# Patient Record
Sex: FEMALE | Race: WHITE | NOT HISPANIC OR LATINO | Employment: FULL TIME
[De-identification: names, ages, dates, MRNs, and addresses within clinical notes are randomized per-mention and may not be internally consistent; named-entity substitution may affect disease eponyms.]

---

## 2017-01-20 ENCOUNTER — RECORDS - HEALTHEAST (OUTPATIENT)
Dept: ADMINISTRATIVE | Facility: OTHER | Age: 37
End: 2017-01-20

## 2017-01-30 ENCOUNTER — HOSPITAL ENCOUNTER (OUTPATIENT)
Dept: MAMMOGRAPHY | Facility: CLINIC | Age: 37
Discharge: HOME | End: 2017-01-30
Attending: OBSTETRICS & GYNECOLOGY

## 2017-01-30 ENCOUNTER — HOSPITAL ENCOUNTER (OUTPATIENT)
Dept: ULTRASOUND IMAGING | Facility: CLINIC | Age: 37
Discharge: HOME | End: 2017-01-30
Attending: OBSTETRICS & GYNECOLOGY

## 2017-01-30 ENCOUNTER — COMMUNICATION - HEALTHEAST (OUTPATIENT)
Dept: TELEHEALTH | Facility: CLINIC | Age: 37
End: 2017-01-30

## 2017-01-30 DIAGNOSIS — N64.4 BREAST PAIN: ICD-10-CM

## 2017-01-30 DIAGNOSIS — N60.02 BREAST CYST, LEFT: ICD-10-CM

## 2019-04-05 ENCOUNTER — HOSPITAL ENCOUNTER (OUTPATIENT)
Dept: MAMMOGRAPHY | Facility: CLINIC | Age: 39
Discharge: HOME | End: 2019-04-05
Attending: OBSTETRICS & GYNECOLOGY

## 2019-04-05 DIAGNOSIS — Z12.31 VISIT FOR SCREENING MAMMOGRAM: ICD-10-CM

## 2021-05-30 ENCOUNTER — RECORDS - HEALTHEAST (OUTPATIENT)
Dept: ADMINISTRATIVE | Facility: CLINIC | Age: 41
End: 2021-05-30

## 2021-06-26 ENCOUNTER — HEALTH MAINTENANCE LETTER (OUTPATIENT)
Age: 41
End: 2021-06-26

## 2021-10-16 ENCOUNTER — HEALTH MAINTENANCE LETTER (OUTPATIENT)
Age: 41
End: 2021-10-16

## 2022-07-23 ENCOUNTER — HEALTH MAINTENANCE LETTER (OUTPATIENT)
Age: 42
End: 2022-07-23

## 2022-10-01 ENCOUNTER — HEALTH MAINTENANCE LETTER (OUTPATIENT)
Age: 42
End: 2022-10-01

## 2022-10-15 ENCOUNTER — HOSPITAL ENCOUNTER (OUTPATIENT)
Dept: MRI IMAGING | Facility: CLINIC | Age: 42
Discharge: HOME | End: 2022-10-15
Attending: EMERGENCY MEDICINE | Admitting: EMERGENCY MEDICINE
Payer: COMMERCIAL

## 2022-10-15 DIAGNOSIS — M25.561 RIGHT KNEE PAIN, UNSPECIFIED CHRONICITY: ICD-10-CM

## 2022-10-15 PROCEDURE — 73721 MRI JNT OF LWR EXTRE W/O DYE: CPT | Mod: RT

## 2022-11-10 ENCOUNTER — OFFICE VISIT (OUTPATIENT)
Dept: ORTHOPEDICS | Facility: CLINIC | Age: 42
End: 2022-11-10
Payer: COMMERCIAL

## 2022-11-10 VITALS — WEIGHT: 140 LBS | BODY MASS INDEX: 20.73 KG/M2 | HEIGHT: 69 IN

## 2022-11-10 DIAGNOSIS — M25.561 CHRONIC PAIN OF RIGHT KNEE: Primary | ICD-10-CM

## 2022-11-10 DIAGNOSIS — G89.29 CHRONIC LEFT HIP PAIN: ICD-10-CM

## 2022-11-10 DIAGNOSIS — M25.552 CHRONIC LEFT HIP PAIN: ICD-10-CM

## 2022-11-10 DIAGNOSIS — M24.852 SNAPPING HIP SYNDROME, LEFT: ICD-10-CM

## 2022-11-10 DIAGNOSIS — G89.29 CHRONIC PAIN OF RIGHT KNEE: Primary | ICD-10-CM

## 2022-11-10 DIAGNOSIS — M22.42 CHONDROMALACIA OF LEFT PATELLOFEMORAL JOINT: ICD-10-CM

## 2022-11-10 DIAGNOSIS — M76.899 TENDINOSIS OF QUADRICEPS TENDON: ICD-10-CM

## 2022-11-10 PROCEDURE — 99204 OFFICE O/P NEW MOD 45 MIN: CPT | Performed by: STUDENT IN AN ORGANIZED HEALTH CARE EDUCATION/TRAINING PROGRAM

## 2022-11-10 NOTE — LETTER
"    11/10/2022         RE: Sofia Duran  [address]   [place] [address]        Dear Colleague,    Thank you for referring your patient, Sofia Duran, to the [facility] [place]. Please see a copy of my visit note below.    ASSESSMENT & PLAN  Patient Instructions     1. Chronic pain of right knee    2. Chondromalacia of left patellofemoral joint    3. Tendinosis of quadriceps tendon    4. Chronic left hip pain    5. Snapping hip syndrome, left      Sofia Duran is a 42 year old female presenting for evaluation of acute right knee and left hip pain x several months. She was initially evaluated at [facility] in September where evaluation was concerning for possible meniscus injury, prompting MRI. MRI was completed, showing subtle cartilage wear of the central femoral trochlear sulcus without high-grade cartilage loss, focal tearing, bone bruising/fracture or injury to the meniscus or ligaments. There was also mild popliteus tendinosis and mild quadricepts tendinosis without tearing. Evaluation today is consistent with pain stemming from the mild patellofemoral chondromalacia (cartilage wear under kneecap) with quad tendinosis and poor quad activation. The component of left hip pain appears to be mechanical in nature relating to kinetic chain dysfunction with gluteus medius weakness. Differential includes hip flexor tendinosis or labral tear. We reviewed treatment options inclusive of pain control, activity modification, bracing and formal physical therapy. We also discussed the role of injections including cortisone injection for flares, hyaluronic acid \"gel\" injections for lubrication of the joint, and platelet rich plasma (PRP).     At this time, will proceed with the following plan  - Referral placed for formal physical therapy with a sports medicine PT to work on the right knee and left hip.   - In the meantime, activity as tolerated based " on pain.   - Ibuprofen 400 mg with food every 4-6 hours as needed for pain.  - Consider icing the painful area after exercise and as needed for pain.  - Compression sleeve can be helpful for the knee.    Please schedule a follow up appointment to see me in 6 weeks, or sooner as needed for persistence or worsening of pain. If the hip persists, we can consider proceeding with advanced imaging (MR Arthrogram). You may call our direct clinic number ([phone]) at any time with questions or concerns.    Alexa Villasenor MD, [facility]        -----    SUBJECTIVE  Sofia Duran is a/an 42 year old female who is seen as a self referral for evaluation of right knee pain. The patient is seen by themselves.    Onset: Patient describes pain began in left hip in Spring 2022. She states 3-4 months ago she pushed herself to do a 10K run and felt pain in the right knee afterwards. She noted feeling a click in the right knee while doing yoga the day after the run and the right knee has been painful and swollen ever since.  Location of Pain: right lateral and posterior knee, left anterior hip  Rating of Pain at worst: right knee - 7/10, left hip - 9/10  Rating of Pain Currently: right knee - 3/10, left hip - 3/10  Worsened by: right knee - knee flexion, walking, stairs, running, left hip - getting it car, external rotation, walking, running  Better with: rest / activity avoidance  Treatments tried: rest/activity avoidance, previous imaging (MRI 10/15/22 right knee, xray left hip 9/29/22 @ [facility], xray right knee 9/29/22 @ [facility]) and ice, compression sleeve, strength training for hip, ibuprofen  Associated symptoms: swelling in right knee, clicking in right hip for 2 years, decreased ROM  Orthopedic history: NO  Relevant surgical history: NO  Social history: works as an , patient is a runner and enjoys being active    No past medical history on file.  Social History  "    Socioeconomic History     Marital status:          Patient's past medical, surgical, social, and family histories were reviewed today and no changes are noted.    REVIEW OF SYSTEMS:  10 point ROS is negative other than symptoms noted above in HPI, Past Medical History or as stated below  Constitutional: NEGATIVE for fever, chills, change in weight  Skin: NEGATIVE for worrisome rashes, moles or lesions  GI/: NEGATIVE for bowel or bladder changes  Neuro: NEGATIVE for weakness, dizziness or paresthesias    OBJECTIVE:  Ht 1.753 m (5' 9\")   Wt 63.5 kg (140 lb)   BMI 20.67 kg/m     General: healthy, alert and in no distress  HEENT: no scleral icterus or conjunctival erythema  Skin: no suspicious lesions or rash. No jaundice.  CV: no pedal edema  Resp: normal respiratory effort without conversational dyspnea   Psych: normal mood and affect  Gait: normal steady gait with appropriate coordination and balance  Neuro: Normal light sensory exam of lower extremity  MSK:  RIGHT KNEE  Inspection:    Normal alignment  Palpation:    Tender about the lateral patellar facet, lateral hoffa's and quadriceps insertion. Remainder of bony and ligamentous landmarks are nontender.    Trace effusion is present    Patellofemoral crepitus is Present  Range of Motion:     00 extension to 1350 flexion - anterior knee pain at end-flexion  Strength:    Quadriceps 5-/5 and hamstrings 5/5    Extensor mechanism intact    Dynamic knee valgus with single leg squat L>R  Special Tests:    Positive: Patellar grind, patellar inhibition    Negative: J sign, patellar apprehension, MCL/valgus stress (0 & 30 deg), LCL/varus stress (0 & 30 deg), Lachman's, anterior drawer, posterior drawer, Kam's    LEFT HIP  Inspection:    No swelling, bruising, discoloration, or obvious deformity or asymmetry  Palpation:    Landmarks are nontender.    Crepitus is Absent  Active Range of Motion:     Flexion full, extension full / IR full / ER  " full  Strength:    Flexion 5/5 / extension 5/5 / adduction 5/5 / abduction 4+/5    Gluteus medius 4/5 (L), 5-/5 (RI)  Special Tests:    Positive: Weakness with resisted gluteus medius provocation; mild anterior impingement (FADIR), jamey and Chucho    Negative: CRUZ Kraus Ober's    Independent visualization of the below image:    Results for orders placed or performed during the hospital encounter of 10/15/22   MR Knee Right w/o Contrast    Narrative    EXAM: MR KNEE RIGHT WITHOUT CONTRAST  LOCATION: [facility]  DATE/TIME: 10/15/2022, 5:15 PM    INDICATION: Pain after trauma.  COMPARISON: None.  TECHNIQUE: Unenhanced.    FINDINGS:    MEDIAL COMPARTMENT:   -Meniscus: Normal.  -Cartilage: Normal.    LATERAL COMPARTMENT:  -Meniscus: Normal.   -Cartilage: Normal.    PATELLOFEMORAL COMPARTMENT:   -Alignment: Patella midline. No subluxation or tilting.   -Cartilage: Slight surface irregularity to the articular cartilage of the central femoral trochlear sulcus. No high-grade cartilage loss or focal tearing and no subchondral edema.    CRUCIATE LIGAMENTS:   -ACL: Normal.  -PCL: Normal.    COLLATERAL LIGAMENTS:   -Medial collateral ligament: Superficial and deep fibers are normal.  -Lateral collateral ligament: Normal.    POSTEROMEDIAL CORNER:  -Distal semimembranosus tendon is normal.   -Pes anserine tendons are normal. Posteromedial corner complex ligaments are intact.    POSTEROLATERAL CORNER:   -Mild popliteus tendinopathy without tearing.  -Biceps femoris tendon and posterolateral corner complex ligaments are intact.    EXTENSOR MECHANISM:   -Quadriceps tendon: Mild tendinopathy.  -Patellar tendon: Normal.  -Patellofemoral ligaments and retinacula: Intact.    JOINT:   -Tiny effusion.    BONES:  -No fracture or concerning marrow replacing lesion.    SOFT TISSUES:   -No popliteal cyst. No acute muscular injury or soft tissue mass.         IMPRESSION:  1.  No evidence for internal  derangement.  2.  Slight surface irregularity to the articular cartilage of the central femoral trochlear sulcus but no high-grade cartilage loss or focal tearing. No subchondral edema.  3.  Mild popliteus tendinopathy without tearing.  4.  Mild quadriceps tendinopathy without tearing.  5.  No evidence for fracture or significant bone marrow signal abnormality.  6.  Tiny effusion.       EXAM: XR PELVIS W LT LATERAL HIP   LOCATION: [facility]   DATE/TIME: 9/29/2022 3:04 PM     INDICATION: Pain   COMPARISON: None.     IMPRESSION: Both hips are negative for fracture or dislocation. Pelvis negative for fracture.        Alexa Villasenor MD, [facility]        Again, thank you for allowing me to participate in the care of your patient.        Sincerely,        Alexa Villasenor MD

## 2022-11-10 NOTE — PROGRESS NOTES
"ASSESSMENT & PLAN  Patient Instructions     1. Chronic pain of right knee    2. Chondromalacia of left patellofemoral joint    3. Tendinosis of quadriceps tendon    4. Chronic left hip pain    5. Snapping hip syndrome, left      Sofia Duran is a 42 year old female presenting for evaluation of acute right knee and left hip pain x several months. She was initially evaluated at [facility] in September where evaluation was concerning for possible meniscus injury, prompting MRI. MRI was completed, showing subtle cartilage wear of the central femoral trochlear sulcus without high-grade cartilage loss, focal tearing, bone bruising/fracture or injury to the meniscus or ligaments. There was also mild popliteus tendinosis and mild quadricepts tendinosis without tearing. Evaluation today is consistent with pain stemming from the mild patellofemoral chondromalacia (cartilage wear under kneecap) with quad tendinosis and poor quad activation. The component of left hip pain appears to be mechanical in nature relating to kinetic chain dysfunction with gluteus medius weakness. Differential includes hip flexor tendinosis or labral tear. We reviewed treatment options inclusive of pain control, activity modification, bracing and formal physical therapy. We also discussed the role of injections including cortisone injection for flares, hyaluronic acid \"gel\" injections for lubrication of the joint, and platelet rich plasma (PRP).     At this time, will proceed with the following plan  - Referral placed for formal physical therapy with a sports medicine PT to work on the right knee and left hip.   - In the meantime, activity as tolerated based on pain.   - Ibuprofen 400 mg with food every 4-6 hours as needed for pain.  - Consider icing the painful area after exercise and as needed for pain.  - Compression sleeve can be helpful for the knee.    Please schedule a follow up appointment to see me in 6 weeks, or sooner as needed for " persistence or worsening of pain. If the hip persists, we can consider proceeding with advanced imaging (MR Arthrogram). You may call our direct clinic number ([phone]) at any time with questions or concerns.    Alexa Villasenor MD, [facility]        -----    SUBJECTIVE  Sofia Duran is a/an 42 year old female who is seen as a self referral for evaluation of right knee pain. The patient is seen by themselves.    Onset: Patient describes pain began in left hip in Spring 2022. She states 3-4 months ago she pushed herself to do a 10K run and felt pain in the right knee afterwards. She noted feeling a click in the right knee while doing yoga the day after the run and the right knee has been painful and swollen ever since.  Location of Pain: right lateral and posterior knee, left anterior hip  Rating of Pain at worst: right knee - 7/10, left hip - 9/10  Rating of Pain Currently: right knee - 3/10, left hip - 3/10  Worsened by: right knee - knee flexion, walking, stairs, running, left hip - getting it car, external rotation, walking, running  Better with: rest / activity avoidance  Treatments tried: rest/activity avoidance, previous imaging (MRI 10/15/22 right knee, xray left hip 9/29/22 @ [facility], xray right knee 9/29/22 @ [facility]) and ice, compression sleeve, strength training for hip, ibuprofen  Associated symptoms: swelling in right knee, clicking in right hip for 2 years, decreased ROM  Orthopedic history: NO  Relevant surgical history: NO  Social history: works as an , patient is a runner and enjoys being active    No past medical history on file.  Social History     Socioeconomic History     Marital status:          Patient's past medical, surgical, social, and family histories were reviewed today and no changes are noted.    REVIEW OF SYSTEMS:  10 point ROS is negative other than symptoms noted above in HPI, Past Medical History or as stated  "below  Constitutional: NEGATIVE for fever, chills, change in weight  Skin: NEGATIVE for worrisome rashes, moles or lesions  GI/: NEGATIVE for bowel or bladder changes  Neuro: NEGATIVE for weakness, dizziness or paresthesias    OBJECTIVE:  Ht 1.753 m (5' 9\")   Wt 63.5 kg (140 lb)   BMI 20.67 kg/m     General: healthy, alert and in no distress  HEENT: no scleral icterus or conjunctival erythema  Skin: no suspicious lesions or rash. No jaundice.  CV: no pedal edema  Resp: normal respiratory effort without conversational dyspnea   Psych: normal mood and affect  Gait: normal steady gait with appropriate coordination and balance  Neuro: Normal light sensory exam of lower extremity  MSK:  RIGHT KNEE  Inspection:    Normal alignment  Palpation:    Tender about the lateral patellar facet, lateral hoffa's and quadriceps insertion. Remainder of bony and ligamentous landmarks are nontender.    Trace effusion is present    Patellofemoral crepitus is Present  Range of Motion:     00 extension to 1350 flexion - anterior knee pain at end-flexion  Strength:    Quadriceps 5-/5 and hamstrings 5/5    Extensor mechanism intact    Dynamic knee valgus with single leg squat L>R  Special Tests:    Positive: Patellar grind, patellar inhibition    Negative: J sign, patellar apprehension, MCL/valgus stress (0 & 30 deg), LCL/varus stress (0 & 30 deg), Lachman's, anterior drawer, posterior drawer, Kam's    LEFT HIP  Inspection:    No swelling, bruising, discoloration, or obvious deformity or asymmetry  Palpation:    Landmarks are nontender.    Crepitus is Absent  Active Range of Motion:     Flexion full, extension full / IR full / ER  full  Strength:    Flexion 5/5 / extension 5/5 / adduction 5/5 / abduction 4+/5    Gluteus medius 4/5 (L), 5-/5 (RI)  Special Tests:    Positive: Weakness with resisted gluteus medius provocation; mild anterior impingement (FADIR), jamey and Chucho    Negative: Logroll, CRUZ, Ivan's    Independent " visualization of the below image:    Results for orders placed or performed during the hospital encounter of 10/15/22   MR Knee Right w/o Contrast    Narrative    EXAM: MR KNEE RIGHT WITHOUT CONTRAST  LOCATION: [facility]  DATE/TIME: 10/15/2022, 5:15 PM    INDICATION: Pain after trauma.  COMPARISON: None.  TECHNIQUE: Unenhanced.    FINDINGS:    MEDIAL COMPARTMENT:   -Meniscus: Normal.  -Cartilage: Normal.    LATERAL COMPARTMENT:  -Meniscus: Normal.   -Cartilage: Normal.    PATELLOFEMORAL COMPARTMENT:   -Alignment: Patella midline. No subluxation or tilting.   -Cartilage: Slight surface irregularity to the articular cartilage of the central femoral trochlear sulcus. No high-grade cartilage loss or focal tearing and no subchondral edema.    CRUCIATE LIGAMENTS:   -ACL: Normal.  -PCL: Normal.    COLLATERAL LIGAMENTS:   -Medial collateral ligament: Superficial and deep fibers are normal.  -Lateral collateral ligament: Normal.    POSTEROMEDIAL CORNER:  -Distal semimembranosus tendon is normal.   -Pes anserine tendons are normal. Posteromedial corner complex ligaments are intact.    POSTEROLATERAL CORNER:   -Mild popliteus tendinopathy without tearing.  -Biceps femoris tendon and posterolateral corner complex ligaments are intact.    EXTENSOR MECHANISM:   -Quadriceps tendon: Mild tendinopathy.  -Patellar tendon: Normal.  -Patellofemoral ligaments and retinacula: Intact.    JOINT:   -Tiny effusion.    BONES:  -No fracture or concerning marrow replacing lesion.    SOFT TISSUES:   -No popliteal cyst. No acute muscular injury or soft tissue mass.         IMPRESSION:  1.  No evidence for internal derangement.  2.  Slight surface irregularity to the articular cartilage of the central femoral trochlear sulcus but no high-grade cartilage loss or focal tearing. No subchondral edema.  3.  Mild popliteus tendinopathy without tearing.  4.  Mild quadriceps tendinopathy without tearing.  5.  No evidence for  fracture or significant bone marrow signal abnormality.  6.  Tiny effusion.       EXAM: XR PELVIS W LT LATERAL HIP   LOCATION: [facility]   DATE/TIME: 9/29/2022 3:04 PM     INDICATION: Pain   COMPARISON: None.     IMPRESSION: Both hips are negative for fracture or dislocation. Pelvis negative for fracture.        Alexa Villasenor MD, CAQSM  [facility]

## 2022-11-10 NOTE — PATIENT INSTRUCTIONS
"1. Chronic pain of right knee    2. Chondromalacia of left patellofemoral joint    3. Tendinosis of quadriceps tendon    4. Chronic left hip pain    5. Snapping hip syndrome, left      Sofia DAPHNE Duran is a 42 year old female presenting for evaluation of acute right knee and left hip pain x several months. She was initially evaluated at [facility] in September where evaluation was concerning for possible meniscus injury, prompting MRI. MRI was completed, showing subtle cartilage wear of the central femoral trochlear sulcus without high-grade cartilage loss, focal tearing, bone bruising/fracture or injury to the meniscus or ligaments. There was also mild popliteus tendinosis and mild quadricepts tendinosis without tearing. Evaluation today is consistent with pain stemming from the mild patellofemoral chondromalacia (cartilage wear under kneecap) with quad tendinosis and poor quad activation. The component of left hip pain appears to be mechanical in nature relating to kinetic chain dysfunction with gluteus medius weakness. Differential includes hip flexor tendinosis or labral tear. We reviewed treatment options inclusive of pain control, activity modification, bracing and formal physical therapy. We also discussed the role of injections including cortisone injection for flares, hyaluronic acid \"gel\" injections for lubrication of the joint, and platelet rich plasma (PRP).     At this time, will proceed with the following plan  - Referral placed for formal physical therapy with a sports medicine PT to work on the right knee and left hip.   - In the meantime, activity as tolerated based on pain.   - Ibuprofen 400 mg with food every 4-6 hours as needed for pain.  - Consider icing the painful area after exercise and as needed for pain.  - Compression sleeve can be helpful for the knee.    Please schedule a follow up appointment to see me in 6 weeks, or sooner as needed for persistence or worsening of pain. If the hip " persists, we can consider proceeding with advanced imaging (MR Arthrogram). You may call our direct clinic number ([phone]) at any time with questions or concerns.    Alexa Villasenor MD, [facility] Orthopedic [facility]

## 2022-11-11 ENCOUNTER — THERAPY VISIT (OUTPATIENT)
Dept: PHYSICAL THERAPY | Facility: CLINIC | Age: 42
End: 2022-11-11
Attending: STUDENT IN AN ORGANIZED HEALTH CARE EDUCATION/TRAINING PROGRAM
Payer: COMMERCIAL

## 2022-11-11 DIAGNOSIS — G89.29 CHRONIC LEFT HIP PAIN: ICD-10-CM

## 2022-11-11 DIAGNOSIS — M24.852 SNAPPING HIP SYNDROME, LEFT: ICD-10-CM

## 2022-11-11 DIAGNOSIS — M22.42 CHONDROMALACIA OF LEFT PATELLOFEMORAL JOINT: Primary | ICD-10-CM

## 2022-11-11 DIAGNOSIS — M25.552 CHRONIC LEFT HIP PAIN: ICD-10-CM

## 2022-11-11 DIAGNOSIS — G89.29 CHRONIC PAIN OF RIGHT KNEE: ICD-10-CM

## 2022-11-11 DIAGNOSIS — M76.899 TENDINOSIS OF QUADRICEPS TENDON: ICD-10-CM

## 2022-11-11 DIAGNOSIS — M25.561 CHRONIC PAIN OF RIGHT KNEE: ICD-10-CM

## 2022-11-11 PROCEDURE — 97110 THERAPEUTIC EXERCISES: CPT | Mod: GP | Performed by: PHYSICAL THERAPIST

## 2022-11-11 PROCEDURE — 97161 PT EVAL LOW COMPLEX 20 MIN: CPT | Mod: GP | Performed by: PHYSICAL THERAPIST

## 2022-11-11 ASSESSMENT — ACTIVITIES OF DAILY LIVING (ADL)
WALKING_UP_STEEP_HILLS: SLIGHT DIFFICULTY
LIGHT_TO_MODERATE_WORK: NO DIFFICULTY AT ALL
PAIN: THE SYMPTOM AFFECTS MY ACTIVITY SLIGHTLY
GETTING_INTO_AND_OUT_OF_A_BATHTUB: NO DIFFICULTY AT ALL
DEEP_SQUATTING: MODERATE DIFFICULTY
ROLLING_OVER_IN_BED: NO DIFFICULTY AT ALL
HOW_WOULD_YOU_RATE_YOUR_CURRENT_LEVEL_OF_FUNCTION_DURING_YOUR_USUAL_ACTIVITIES_OF_DAILY_LIVING_FROM_0_TO_100_WITH_100_BEING_YOUR_LEVEL_OF_FUNCTION_PRIOR_TO_YOUR_HIP_PROBLEM_AND_0_BEING_THE_INABILITY_TO_PERFORM_ANY_OF_YOUR_USUAL_DAILY_ACTIVITIES?: 80
HEAVY_WORK: MODERATE DIFFICULTY
LIMPING: THE SYMPTOM AFFECTS MY ACTIVITY MODERATELY
WALKING_INITIALLY: NO DIFFICULTY AT ALL
PUTTING_ON_SOCKS_AND_SHOES: NO DIFFICULTY AT ALL
STANDING_FOR_15_MINUTES: NO DIFFICULTY AT ALL
WALKING_APPROXIMATELY_10_MINUTES: NO DIFFICULTY AT ALL
HOW_WOULD_YOU_RATE_THE_OVERALL_FUNCTION_OF_YOUR_KNEE_DURING_YOUR_USUAL_DAILY_ACTIVITIES?: ABNORMAL
RAW_SCORE: 38
GO UP STAIRS: ACTIVITY IS SOMEWHAT DIFFICULT
STIFFNESS: THE SYMPTOM AFFECTS MY ACTIVITY MODERATELY
SWELLING: THE SYMPTOM AFFECTS MY ACTIVITY MODERATELY
KNEE_ACTIVITY_OF_DAILY_LIVING_SUM: 38
WEAKNESS: THE SYMPTOM AFFECTS MY ACTIVITY MODERATELY
HOW_WOULD_YOU_RATE_THE_CURRENT_FUNCTION_OF_YOUR_KNEE_DURING_YOUR_USUAL_DAILY_ACTIVITIES_ON_A_SCALE_FROM_0_TO_100_WITH_100_BEING_YOUR_LEVEL_OF_KNEE_FUNCTION_PRIOR_TO_YOUR_INJURY_AND_0_BEING_THE_INABILITY_TO_PERFORM_ANY_OF_YOUR_USUAL_DAILY_ACTIVITIES?: 70
GO DOWN STAIRS: ACTIVITY IS SOMEWHAT DIFFICULT
HOS_ADL_COUNT: 17
STAND: ACTIVITY IS SOMEWHAT DIFFICULT
SQUAT: ACTIVITY IS VERY DIFFICULT
WALKING_15_MINUTES_OR_GREATER: SLIGHT DIFFICULTY
KNEE_ACTIVITY_OF_DAILY_LIVING_SCORE: 54.29
HOS_ADL_ITEM_SCORE_TOTAL: 59
TWISTING/PIVOTING_ON_INVOLVED_LEG: NO DIFFICULTY AT ALL
GOING_UP_1_FLIGHT_OF_STAIRS: NO DIFFICULTY AT ALL
AS_A_RESULT_OF_YOUR_KNEE_INJURY,_HOW_WOULD_YOU_RATE_YOUR_CURRENT_LEVEL_OF_DAILY_ACTIVITY?: NEARLY NORMAL
GETTING_INTO_AND_OUT_OF_AN_AVERAGE_CAR: NO DIFFICULTY AT ALL
KNEEL ON THE FRONT OF YOUR KNEE: ACTIVITY IS VERY DIFFICULT
WALK: ACTIVITY IS SOMEWHAT DIFFICULT
RISE FROM A CHAIR: ACTIVITY IS MINIMALLY DIFFICULT
HOS_ADL_SCORE(%): 86.76
GOING_DOWN_1_FLIGHT_OF_STAIRS: NO DIFFICULTY AT ALL
STEPPING_UP_AND_DOWN_CURBS: NO DIFFICULTY AT ALL
SITTING_FOR_15_MINUTES: NO DIFFICULTY AT ALL
GIVING WAY, BUCKLING OR SHIFTING OF KNEE: I DO NOT HAVE THE SYMPTOM
WALKING_DOWN_STEEP_HILLS: SLIGHT DIFFICULTY
SIT WITH YOUR KNEE BENT: ACTIVITY IS MINIMALLY DIFFICULT
HOS_ADL_HIGHEST_POTENTIAL_SCORE: 68
RECREATIONAL_ACTIVITIES: MODERATE DIFFICULTY

## 2022-11-11 NOTE — PROGRESS NOTES
Physical Therapy Initial Evaluation  Subjective:    Patient Health History  Sofia Duran being seen for Left hip; right knee pain.     Problem began: 6/15/2022.   Problem occurred: Left hip has been recurring for some time; right knee pain exacerbated by high intensity running/workouts.   Pain is reported as 6/10 on pain scale.  General health as reported by patient is good.  Pertinent medical history includes: none and other.     Medical allergies: none.   Surgeries include:  None.        Current occupation is .   Primary job tasks include:  Computer work, prolonged sitting and repetitive tasks.                Physical Therapy Initial Evaluation   11/11/2022   Precautions/Restrictions/MD instructions: PT eval and treat    Therapist Impression:   Pt is a 41 y/o female, with chronic history of L hip and R knee pain. Pt presents with pain, decreased ROM, poor balance and decreased strnegth consistent with R meniscal inflammation and bilateral glute weakness. These impairments limit their ability to stairs, run, get into car. Skilled PT services necessary in order to reduce impairments and improve independent function.    Subjective:   Chief Complaint: L hip pain (anterior); R knee lateral   DOI/onset: 5/15/2022  DOS: none  Location: Anterior L hip, lateral R knee  Quality: achy  Frequency: constant in knee; intermittent with hip  Radiates: none   Pain scale: Rest 1/10 Activity 9/10    Sleeping: not recently    Exacerbated by: hip: run, tennis, in/out of car;  Knee: stairs  Relieved by: rest  Progression: same  Previous Treatment: none  Effect of prior treatment: n/a    PMH and/or surgical history: none   Imaging: xray    Occupation:   Job duties: computer    Current HEP/exercise regimen: biking (5day/week 30-45min); would like to return to weight lifting, and running  Patient's goals: return to run and tennis  Medications: none   General health as reported by patient: excellent   Return to  MD: as needed   Red Flags: none                        Objective:  KNEE:      SL Balance:   L: 30 sec (R hip drop)  R: 30 sec (L hip drop)      Functional:   - Squat- knee forward and shift to left  - SL Squat: bilateral dynamic valgus      Swellin+ effusion on R knee          AROM: (* indicates patient's pain)   PROM:(over pressure)   L  R L R   Hyperextension   0 0 2 2   Extension   0 0 0 0   Flexion   145 145 145 145     Strength:   L R   HIP     Flex     Ext     ABd     KNEE     QS: fair on right      Hip PROM:     L R   Flexion 120* 120   Extension     IR 60 45   ER 50* (tighter) 50   Ivan's     Hamstring 90-90     Chucho         Special tests:   L R   Sweep Test - +     Palpation: slight lateral joint line tenderness on right knee        System    Physical Exam    General     ROS    Assessment/Plan:    Patient is a 42 year old female with left side hip and right side knee complaints.    Patient has the following significant findings with corresponding treatment plan.                Diagnosis 1:  R knee pain; L hip pain  Pain -  manual therapy, self management, education, directional preference exercise and home program  Decreased ROM/flexibility - manual therapy and therapeutic exercise  Decreased joint mobility - manual therapy and therapeutic exercise  Decreased strength - therapeutic exercise and therapeutic activities  Impaired balance - neuro re-education and therapeutic activities  Decreased proprioception - neuro re-education and therapeutic activities  Inflammation - cold therapy and self management/home program  Impaired gait - gait training  Impaired muscle performance - neuro re-education  Decreased function - therapeutic activities  Instability -  Therapeutic Activity  Therapeutic Exercise    Therapy Evaluation Codes:       Cumulative Therapy Evaluation is: Low complexity.    Previous and current functional limitations:  (See Goal Flow Sheet for this information)    Short term and Long term  goals: (See Goal Flow Sheet for this information)     Communication ability:  Patient appears to be able to clearly communicate and understand verbal and written communication and follow directions correctly.  Treatment Explanation - The following has been discussed with the patient:   RX ordered/plan of care  Anticipated outcomes  Possible risks and side effects  This patient would benefit from PT intervention to resume normal activities.   Rehab potential is good.    Frequency:  1 X week, once daily  Duration:  for 6 weeks  Discharge Plan:  Achieve all LTG.  Independent in home treatment program.  Reach maximal therapeutic benefit.    Please refer to the daily flowsheet for treatment today, total treatment time and time spent performing 1:1 timed codes.

## 2022-12-05 ENCOUNTER — THERAPY VISIT (OUTPATIENT)
Dept: PHYSICAL THERAPY | Facility: CLINIC | Age: 42
End: 2022-12-05
Payer: COMMERCIAL

## 2022-12-05 DIAGNOSIS — M24.852 SNAPPING HIP SYNDROME, LEFT: ICD-10-CM

## 2022-12-05 DIAGNOSIS — M25.561 CHRONIC PAIN OF RIGHT KNEE: Primary | ICD-10-CM

## 2022-12-05 DIAGNOSIS — G89.29 CHRONIC LEFT HIP PAIN: ICD-10-CM

## 2022-12-05 DIAGNOSIS — M25.552 CHRONIC LEFT HIP PAIN: ICD-10-CM

## 2022-12-05 DIAGNOSIS — G89.29 CHRONIC PAIN OF RIGHT KNEE: Primary | ICD-10-CM

## 2022-12-05 PROCEDURE — 97110 THERAPEUTIC EXERCISES: CPT | Mod: GP | Performed by: PHYSICAL THERAPIST

## 2022-12-05 PROCEDURE — 97140 MANUAL THERAPY 1/> REGIONS: CPT | Mod: GP | Performed by: PHYSICAL THERAPIST

## 2023-01-10 ENCOUNTER — THERAPY VISIT (OUTPATIENT)
Dept: PHYSICAL THERAPY | Facility: CLINIC | Age: 43
End: 2023-01-10
Payer: COMMERCIAL

## 2023-01-10 DIAGNOSIS — M25.552 CHRONIC LEFT HIP PAIN: ICD-10-CM

## 2023-01-10 DIAGNOSIS — G89.29 CHRONIC LEFT HIP PAIN: ICD-10-CM

## 2023-01-10 DIAGNOSIS — G89.29 CHRONIC PAIN OF RIGHT KNEE: Primary | ICD-10-CM

## 2023-01-10 DIAGNOSIS — M25.561 CHRONIC PAIN OF RIGHT KNEE: Primary | ICD-10-CM

## 2023-01-10 PROCEDURE — 97110 THERAPEUTIC EXERCISES: CPT | Mod: GP | Performed by: PHYSICAL THERAPIST

## 2023-01-23 ENCOUNTER — OFFICE VISIT (OUTPATIENT)
Dept: ORTHOPEDICS | Facility: CLINIC | Age: 43
End: 2023-01-23
Payer: COMMERCIAL

## 2023-01-23 VITALS — WEIGHT: 140 LBS | HEIGHT: 69 IN | BODY MASS INDEX: 20.73 KG/M2

## 2023-01-23 DIAGNOSIS — M25.561 CHRONIC PAIN OF RIGHT KNEE: Primary | ICD-10-CM

## 2023-01-23 DIAGNOSIS — M76.899 TENDINOSIS OF QUADRICEPS TENDON: ICD-10-CM

## 2023-01-23 DIAGNOSIS — M22.41 CHONDROMALACIA OF RIGHT PATELLOFEMORAL JOINT: ICD-10-CM

## 2023-01-23 DIAGNOSIS — G89.29 CHRONIC PAIN OF RIGHT KNEE: Primary | ICD-10-CM

## 2023-01-23 PROCEDURE — 99214 OFFICE O/P EST MOD 30 MIN: CPT | Performed by: STUDENT IN AN ORGANIZED HEALTH CARE EDUCATION/TRAINING PROGRAM

## 2023-01-23 NOTE — PATIENT INSTRUCTIONS
1. Chronic pain of right knee    2. Chondromalacia of right patellofemoral joint    3. Tendinosis of quadriceps tendon      Sofia Duran is a 42 year old female presenting for follow up of acute right knee pain. Evaluation remains consistent with mild patellofemoral chondromalacia (cartilage wear under kneecap). Upon discussion of management options, plan to proceed with platelet rich plasma (PRP) injection.     - Please see PRP pre-injection instructions provided below.    Follow up as desired for a PRP injection. You may call our direct clinic number ([phone]) at any time with questions or concerns.    Alexa Villasenor MD, [facility]    PRP (Platelet Rich Plasma) Pre-Procedure Instructions  1. Please stop all aspirin, aspirin products and all non steroidal anti-inflammatories (NSAIDS: Motrin, Advil, Naprosyn, Naproxen, Aleve, Indocin, Mobic, Toradol, Voltaren, Arthrotec, Ibuprofen, Diclofenac) 7 days prior to the procedure.  2. If you take turmeric, fish oil or vitamin E, please stop 5 days prior to the procedure.  3. If you take Coumadin, Lovenox, Warfarin, Pradaxa, Plavix, Prasugrel or any other blood thinner please discuss this with your doctor. These medications will need to be stopped prior to the procedure and requires the permission of the physician prescribing them.  4. Begin to hydrate by drinking 8 glasses (64 ounces) of water in the 24 hours prior to your procedure.  5. For your safety, you should arrange to have someone drive you home after the procedure.  6. Please note that PRP is an out-of-pocket expense and you will be charged $800/joint at the time of your injection.    You will have increased pain after the procedure and will be given a narcotic pain medication to help with pain. No alcohol or driving while taking this medication. Otherwise you can use Tylenol for pain. No anti-inflammatories (NSAIDS or ice) for 2 weeks after the  PRP injection.    You will see me one week after your injection and we will plan to start formal physical therapy 1-2 weeks after the injection.  We will see how your pain and function are approximately 6-8 weeks from the procedure. The decision to do another injection, if necessary, will be determined at this time.    For cartilage wear and osteoarthritis:  1. Non-weight bearing for 3 days but please do gentle range of motion of the joint.  2. Thereafter, walking is allowed but no weight bearing exercise for 2 weeks. Swimming and no resistance recumbent bike is allowed during this 2 week period.  4. Weight bearing exercises can begin after 2 weeks depending on your response to treatment.

## 2023-01-23 NOTE — PROGRESS NOTES
"ASSESSMENT & PLAN       1. Chronic pain of right knee    2. Chondromalacia of right patellofemoral joint    3. Tendinosis of quadriceps tendon      Sofia Duran is a 42 year old female presenting for follow up of acute right knee pain. Evaluation remains consistent with mild patellofemoral chondromalacia (cartilage wear under kneecap). Upon discussion of management options, plan to proceed with platelet rich plasma (PRP) injection.   - Please see PRP pre-injection instructions provided below.    Follow up next Wednesday 2/1/23 for right knee PRP injection. You may call our direct clinic number ([phone]) at any time with questions or concerns.    Alexa Villasenor MD, [facility]    -----    SUBJECTIVE:  Sofia Duran is a 42 year old female who is seen in follow-up for right knee and left hip pain. They were last seen 11/10/2022.     Since their last visit reports worsening pain in right knee. Patient reports left hip \"has been fine\" since she has been inactive due to right knee pain. Pain is located in right posterior and anterior knee. Patient reports swelling in right anterior knee. Patient reports pain increases with walking, stairs, exercise. They indicate that their current pain level is 7/10. They have tried rest/activity avoidance, elevation, ice, home exercises and physical therapy (3 visits), taping, ibuprofen, Tumeric - taking daily for 2 weeks, compression, MRI 10/15/22, xray 9/29/22 @ [facility] - images in PACS.      The patient is seen by themselves.    Patient's past medical, surgical, social, and family histories were reviewed today and no changes are noted.    REVIEW OF SYSTEMS:  Constitutional: NEGATIVE for fever, chills, change in weight  Skin: NEGATIVE for worrisome rashes, moles or lesions  GI/: NEGATIVE for bowel or bladder changes  Neuro: NEGATIVE for weakness, dizziness or paresthesias    OBJECTIVE:  Ht 1.753 m (5' 9\")   " Wt 63.5 kg (140 lb)   BMI 20.67 kg/m     General: healthy, alert and in no distress  HEENT: no scleral icterus or conjunctival erythema  Skin: no suspicious lesions or rash. No jaundice.  CV: regular rhythm by palpation, no pedal edema  Resp: normal respiratory effort without conversational dyspnea   Psych: normal mood and affect  Gait: normal steady gait with appropriate coordination and balance  Neuro: normal light touch sensory exam of the extremities.    MSK:  RIGHT KNEE  Inspection:    Normal alignment  Palpation:    Tender about the patellar facets, lateral hoffa's and quadriceps insertion (mild). Remainder of bony and ligamentous landmarks are nontender.    Small effusion is present    Patellofemoral crepitus is Present  Range of Motion:     00 extension to 1350 flexion - anterior knee pain at end-flexion  Strength:    Quadriceps 5-/5 and hamstrings 5/5    Extensor mechanism intact  Special Tests:    Positive: Patellar grind, patellar inhibition    Negative: J sign, patellar apprehension, MCL/valgus stress (0 & 30 deg), LCL/varus stress (0 & 30 deg), Lachman's, anterior drawer, posterior drawer, Kam's    Independent visualization of the below image:  Results for orders placed or performed during the hospital encounter of 10/15/22   MR Knee Right w/o Contrast    Narrative    EXAM: MR KNEE RIGHT WITHOUT CONTRAST  LOCATION: [facility]  DATE/TIME: 10/15/2022, 5:15 PM    INDICATION: Pain after trauma.  COMPARISON: None.  TECHNIQUE: Unenhanced.    FINDINGS:    MEDIAL COMPARTMENT:   -Meniscus: Normal.  -Cartilage: Normal.    LATERAL COMPARTMENT:  -Meniscus: Normal.   -Cartilage: Normal.    PATELLOFEMORAL COMPARTMENT:   -Alignment: Patella midline. No subluxation or tilting.   -Cartilage: Slight surface irregularity to the articular cartilage of the central femoral trochlear sulcus. No high-grade cartilage loss or focal tearing and no subchondral edema.    CRUCIATE LIGAMENTS:   -ACL:  Normal.  -PCL: Normal.    COLLATERAL LIGAMENTS:   -Medial collateral ligament: Superficial and deep fibers are normal.  -Lateral collateral ligament: Normal.    POSTEROMEDIAL CORNER:  -Distal semimembranosus tendon is normal.   -Pes anserine tendons are normal. Posteromedial corner complex ligaments are intact.    POSTEROLATERAL CORNER:   -Mild popliteus tendinopathy without tearing.  -Biceps femoris tendon and posterolateral corner complex ligaments are intact.    EXTENSOR MECHANISM:   -Quadriceps tendon: Mild tendinopathy.  -Patellar tendon: Normal.  -Patellofemoral ligaments and retinacula: Intact.    JOINT:   -Tiny effusion.    BONES:  -No fracture or concerning marrow replacing lesion.    SOFT TISSUES:   -No popliteal cyst. No acute muscular injury or soft tissue mass.         IMPRESSION:  1.  No evidence for internal derangement.  2.  Slight surface irregularity to the articular cartilage of the central femoral trochlear sulcus but no high-grade cartilage loss or focal tearing. No subchondral edema.  3.  Mild popliteus tendinopathy without tearing.  4.  Mild quadriceps tendinopathy without tearing.  5.  No evidence for fracture or significant bone marrow signal abnormality.  6.  Tiny effusion.       XR KNEE RT 4 VIEWS, XR KNEE LT 1-2 VIEWS COMPARISON   LOCATION: [facility]   DATE/TIME: 9/29/2022 3:13 PM     INDICATION: Pain   COMPARISON: None.     IMPRESSION: Both knees negative for fracture or compartmental narrowing. No effusion.      Alexa Villasenor MD, CAM  [facility]

## 2023-01-23 NOTE — LETTER
"    1/23/2023         RE: Sofia Duran  [address]  [place] [address]        Dear Colleague,    Thank you for referring your patient, Sofia Duran, to the [facility] [place]. Please see a copy of my visit note below.    ASSESSMENT & PLAN       1. Chronic pain of right knee    2. Chondromalacia of right patellofemoral joint    3. Tendinosis of quadriceps tendon      Sofia Duran is a 42 year old female presenting for follow up of acute right knee pain. Evaluation remains consistent with mild patellofemoral chondromalacia (cartilage wear under kneecap). Upon discussion of management options, plan to proceed with platelet rich plasma (PRP) injection.   - Please see PRP pre-injection instructions provided below.    Follow up next Wednesday 2/1/23 for right knee PRP injection. You may call our direct clinic number ([phone]) at any time with questions or concerns.    Alexa Villasenor MD, [facility]    -----    SUBJECTIVE:  Sofia Duran is a 42 year old female who is seen in follow-up for right knee and left hip pain. They were last seen 11/10/2022.     Since their last visit reports worsening pain in right knee. Patient reports left hip \"has been fine\" since she has been inactive due to right knee pain. Pain is located in right posterior and anterior knee. Patient reports swelling in right anterior knee. Patient reports pain increases with walking, stairs, exercise. They indicate that their current pain level is 7/10. They have tried rest/activity avoidance, elevation, ice, home exercises and physical therapy (3 visits), taping, ibuprofen, Tumeric - taking daily for 2 weeks, compression, MRI 10/15/22, xray 9/29/22 @ [facility] - images in PACS.      The patient is seen by themselves.    Patient's past medical, surgical, social, and family histories were reviewed today and no " "changes are noted.    REVIEW OF SYSTEMS:  Constitutional: NEGATIVE for fever, chills, change in weight  Skin: NEGATIVE for worrisome rashes, moles or lesions  GI/: NEGATIVE for bowel or bladder changes  Neuro: NEGATIVE for weakness, dizziness or paresthesias    OBJECTIVE:  Ht 1.753 m (5' 9\")   Wt 63.5 kg (140 lb)   BMI 20.67 kg/m     General: healthy, alert and in no distress  HEENT: no scleral icterus or conjunctival erythema  Skin: no suspicious lesions or rash. No jaundice.  CV: regular rhythm by palpation, no pedal edema  Resp: normal respiratory effort without conversational dyspnea   Psych: normal mood and affect  Gait: normal steady gait with appropriate coordination and balance  Neuro: normal light touch sensory exam of the extremities.    MSK:  RIGHT KNEE  Inspection:    Normal alignment  Palpation:    Tender about the patellar facets, lateral hoffa's and quadriceps insertion (mild). Remainder of bony and ligamentous landmarks are nontender.    Small effusion is present    Patellofemoral crepitus is Present  Range of Motion:     00 extension to 1350 flexion - anterior knee pain at end-flexion  Strength:    Quadriceps 5-/5 and hamstrings 5/5    Extensor mechanism intact  Special Tests:    Positive: Patellar grind, patellar inhibition    Negative: J sign, patellar apprehension, MCL/valgus stress (0 & 30 deg), LCL/varus stress (0 & 30 deg), Lachman's, anterior drawer, posterior drawer, Kam's    Independent visualization of the below image:  Results for orders placed or performed during the hospital encounter of 10/15/22   MR Knee Right w/o Contrast    Narrative    EXAM: MR KNEE RIGHT WITHOUT CONTRAST  LOCATION: [facility]  DATE/TIME: 10/15/2022, 5:15 PM    INDICATION: Pain after trauma.  COMPARISON: None.  TECHNIQUE: Unenhanced.    FINDINGS:    MEDIAL COMPARTMENT:   -Meniscus: Normal.  -Cartilage: Normal.    LATERAL COMPARTMENT:  -Meniscus: Normal.   -Cartilage: " Normal.    PATELLOFEMORAL COMPARTMENT:   -Alignment: Patella midline. No subluxation or tilting.   -Cartilage: Slight surface irregularity to the articular cartilage of the central femoral trochlear sulcus. No high-grade cartilage loss or focal tearing and no subchondral edema.    CRUCIATE LIGAMENTS:   -ACL: Normal.  -PCL: Normal.    COLLATERAL LIGAMENTS:   -Medial collateral ligament: Superficial and deep fibers are normal.  -Lateral collateral ligament: Normal.    POSTEROMEDIAL CORNER:  -Distal semimembranosus tendon is normal.   -Pes anserine tendons are normal. Posteromedial corner complex ligaments are intact.    POSTEROLATERAL CORNER:   -Mild popliteus tendinopathy without tearing.  -Biceps femoris tendon and posterolateral corner complex ligaments are intact.    EXTENSOR MECHANISM:   -Quadriceps tendon: Mild tendinopathy.  -Patellar tendon: Normal.  -Patellofemoral ligaments and retinacula: Intact.    JOINT:   -Tiny effusion.    BONES:  -No fracture or concerning marrow replacing lesion.    SOFT TISSUES:   -No popliteal cyst. No acute muscular injury or soft tissue mass.         IMPRESSION:  1.  No evidence for internal derangement.  2.  Slight surface irregularity to the articular cartilage of the central femoral trochlear sulcus but no high-grade cartilage loss or focal tearing. No subchondral edema.  3.  Mild popliteus tendinopathy without tearing.  4.  Mild quadriceps tendinopathy without tearing.  5.  No evidence for fracture or significant bone marrow signal abnormality.  6.  Tiny effusion.       XR KNEE RT 4 VIEWS, XR KNEE LT 1-2 VIEWS COMPARISON   LOCATION: [facility]   DATE/TIME: 9/29/2022 3:13 PM     INDICATION: Pain   COMPARISON: None.     IMPRESSION: Both knees negative for fracture or compartmental narrowing. No effusion.      Alexa Villasenor MD, [facility]              Again, thank you for allowing me to participate in the care of your patient.         Sincerely,        Alexa Villasenor MD

## 2023-02-01 ENCOUNTER — OFFICE VISIT (OUTPATIENT)
Dept: ORTHOPEDICS | Facility: CLINIC | Age: 43
End: 2023-02-01

## 2023-02-01 DIAGNOSIS — M22.41 CHONDROMALACIA OF RIGHT PATELLOFEMORAL JOINT: ICD-10-CM

## 2023-02-01 DIAGNOSIS — M25.561 CHRONIC PAIN OF RIGHT KNEE: Primary | ICD-10-CM

## 2023-02-01 DIAGNOSIS — G89.29 CHRONIC PAIN OF RIGHT KNEE: Primary | ICD-10-CM

## 2023-02-01 PROCEDURE — 0232T NJX PLATELET PLASMA: CPT | Mod: RT | Performed by: STUDENT IN AN ORGANIZED HEALTH CARE EDUCATION/TRAINING PROGRAM

## 2023-02-01 RX ORDER — HYDROCODONE BITARTRATE AND ACETAMINOPHEN 5; 325 MG/1; MG/1
1 TABLET ORAL EVERY 6 HOURS PRN
Qty: 8 TABLET | Refills: 0 | Status: SHIPPED | OUTPATIENT
Start: 2023-02-01 | End: 2023-02-03

## 2023-02-01 NOTE — LETTER
2/1/2023         RE: Sofia Duran  [address]   [place] [address]        Dear Colleague,    Thank you for referring your patient, Sofia Duran, to the [facility] [place]. Please see a copy of my visit note below.    Ultrasound-guided right knee intra-articular PRP Injection    Diagnoses (preoperative and postoperative):  Right knee patellofemoral chondromalacia    Current Procedure (include preoperative):  Sonographically guided right knee intra-articular PRP injection    Current Indication (include preoperative):  Alleviation of pain and swelling    Patient has elected to receive a right knee intra-articular PRP injection to help with modulation of pain and to promote cartilage healing. Sonographic guidance will be used to ensure accurate placement of the medication into the plantar fascia.    PATIENT EDUCATION:  Ready to learn with no apparent learning barriers identified. Learning preferences include listening. Explained diagnosis and treatment plan as well as treatment alternatives. Patient expressed understanding of the content.    Following denial of allergy and review of potential side effects and complications including but not necessarily limited to infection, bleeding, allergic reaction, post-injection flare, local tissue breakdown (including but not limited to potential for skin depigmentation and/or subcutaneous fat atrophy), patient indicated their understanding and agreed to proceed. Written and signed consent was obtained and is scanned into the chart.    PROCEDURE:  The patient was prepped and approximately 60cc of whole blood was withdrawn using a standard sterile technique via antecubiatal access of the arm. The whole blood was processed on location in the [employer] System and approximately 8 cc of Platelet Rich Plasma was obtained.        Prior to the procedure, the right knee was examined with a 12 MHz linear  transducer to visualize the joint and suprapatellar pouch, and to determine the approach for the procedure.    Procedure was carried out using sterile technique including Chloraprep x 2, a sterile transducer cover, and sterile transducer gel. A simple surgical tray was used.    PROCEDURAL PAUSE:  Procedural pause conducted to verify correct patient identity, procedure to be performed, and as applicable, correct side/site, correct patient position, availability of implants, special equipment, or special requirements.    Patient position:  Supine    Transducer type: 12 MHz linear array transducer    Approach:  In line to the transducer    Local Anesthesia: 2 cc of 1% Lidocaine was used to anesthetize the skin taking care not to introduce Lidocaine into the knee joint.    Injectate:  Sonographically guided 25-gauge 2.5-inch needle was directly visualized entering down into the right knee.  After confirming needle tip position a solution with total of volume of 8 cc of PRP was injected into the right knee.    AFTERCARE:  Patient tolerated the procedure without complication. After a short observation period, patient was discharged under their own power and in excellent condition.     Follow up in one week.    Alexa Villasenor MD, [facility]              Again, thank you for allowing me to participate in the care of your patient.        Sincerely,        Alexa Villasenor MD

## 2023-02-01 NOTE — PROGRESS NOTES
Ultrasound-guided right knee intra-articular PRP Injection    Diagnoses (preoperative and postoperative):  Right knee patellofemoral chondromalacia    Current Procedure (include preoperative):  Sonographically guided right knee intra-articular PRP injection    Current Indication (include preoperative):  Alleviation of pain and swelling    Patient has elected to receive a right knee intra-articular PRP injection to help with modulation of pain and to promote cartilage healing. Sonographic guidance will be used to ensure accurate placement of the medication into the plantar fascia.    PATIENT EDUCATION:  Ready to learn with no apparent learning barriers identified. Learning preferences include listening. Explained diagnosis and treatment plan as well as treatment alternatives. Patient expressed understanding of the content.    Following denial of allergy and review of potential side effects and complications including but not necessarily limited to infection, bleeding, allergic reaction, post-injection flare, local tissue breakdown (including but not limited to potential for skin depigmentation and/or subcutaneous fat atrophy), patient indicated their understanding and agreed to proceed. Written and signed consent was obtained and is scanned into the chart.    PROCEDURE:  The patient was prepped and approximately 60cc of whole blood was withdrawn using a standard sterile technique via antecubiatal access of the arm. The whole blood was processed on location in the [employer] [facility] and approximately 8 cc of Platelet Rich Plasma was obtained.        Prior to the procedure, the right knee was examined with a 12 MHz linear transducer to visualize the joint and suprapatellar pouch, and to determine the approach for the procedure.    Procedure was carried out using sterile technique including Chloraprep x 2, a sterile transducer cover, and sterile transducer gel. A simple surgical tray was used.    PROCEDURAL  PAUSE:  Procedural pause conducted to verify correct patient identity, procedure to be performed, and as applicable, correct side/site, correct patient position, availability of implants, special equipment, or special requirements.    Patient position:  Supine    Transducer type: 12 MHz linear array transducer    Approach:  In line to the transducer    Local Anesthesia: 2 cc of 1% Lidocaine was used to anesthetize the skin taking care not to introduce Lidocaine into the knee joint.    Injectate:  Sonographically guided 25-gauge 2.5-inch needle was directly visualized entering down into the right knee.  After confirming needle tip position a solution with total of volume of 8 cc of PRP was injected into the right knee.    AFTERCARE:  Patient tolerated the procedure without complication. After a short observation period, patient was discharged under their own power and in excellent condition.     Follow up in one week.    Alexa Villasenor MD, [facility]

## 2023-02-01 NOTE — PATIENT INSTRUCTIONS
1. Chronic pain of right knee    2. Chondromalacia of right patellofemoral joint      - An ultrasound-guided right knee PRP injection was performed today without complication.  - Please see post-injection instructions below.  - Recommend resuming PT with [facility] starting in 2-3 weeks. As symptoms improve, you will be able to gradually increase activity as tolerated based on pain.    Please schedule a follow up (phone or in-person versus [employer] message) in 1 week for follow up. Also recommend scheduling a follow up appt in 6 weeks as well. You may call our direct clinic number ([phone]) at any time with questions or concerns.    Alexa Villasenor MD, [facility]      PRP (Platelet Rich Plasma) Post-Procedure Instructions  1. For 2 weeks after the procedure, do not take NSAID anti-inflammatories (including Motrin, Advil, Naprosyn, Naproxen, Aleve, Indocin, Mobic, Toradol, Voltaren, Arthrotec, Ibuprofen, Diclofenac).  2. You can take Tylenol up to 1000 mg per dose. Do not take more than 3,000 mg per day  3. You will be given a prescription strength pain medication (Norco) to be used sparingly for severe, breakthrough pain over the first few days following the injection. No driving or alcohol while taking narcotics.  4. If pain is persistent after Tylenol and Norco, you may apply ice to the affected area for 20 minutes. Try to avoid icing within the first 2 weeks.  5. Plan to rest the remainder of the day after the procedure.  6. A follow-up appointment should be scheduled for 1 week after the procedure.  7. Formal physical therapy, if needed, will begin 1-2 weeks after the procedure.  8. It can take up to 6-8 weeks to determine your full response to the PRP injection.    For cartilage wear and osteoarthritis:  1. Non-weight bearing for 3 days but please do gentle range of motion of the joint.  2. Thereafter, walking is allowed but no weight bearing exercise for 2 weeks.  Swimming and no resistance recumbent bike is allowed during this 2 week period.  4. Weight bearing exercises can begin after 2 weeks depending on your response to treatment.

## 2023-02-11 ENCOUNTER — MYC MEDICAL ADVICE (OUTPATIENT)
Dept: ORTHOPEDICS | Facility: CLINIC | Age: 43
End: 2023-02-11
Payer: COMMERCIAL

## 2023-02-13 ENCOUNTER — LAB REQUISITION (OUTPATIENT)
Dept: LAB | Facility: CLINIC | Age: 43
End: 2023-02-13

## 2023-02-13 DIAGNOSIS — Z13.0 ENCOUNTER FOR SCREENING FOR DISEASES OF THE BLOOD AND BLOOD-FORMING ORGANS AND CERTAIN DISORDERS INVOLVING THE IMMUNE MECHANISM: ICD-10-CM

## 2023-02-13 DIAGNOSIS — Z13.220 ENCOUNTER FOR SCREENING FOR LIPOID DISORDERS: ICD-10-CM

## 2023-02-13 DIAGNOSIS — Z13.29 ENCOUNTER FOR SCREENING FOR OTHER SUSPECTED ENDOCRINE DISORDER: ICD-10-CM

## 2023-02-13 DIAGNOSIS — Z13.1 ENCOUNTER FOR SCREENING FOR DIABETES MELLITUS: ICD-10-CM

## 2023-02-13 LAB
CHOLEST SERPL-MCNC: 199 MG/DL
HBA1C MFR BLD: 5 %
HDLC SERPL-MCNC: 91 MG/DL
HGB BLD-MCNC: 14 G/DL (ref 11.7–15.7)
LDLC SERPL CALC-MCNC: 95 MG/DL
NONHDLC SERPL-MCNC: 108 MG/DL
TRIGL SERPL-MCNC: 64 MG/DL
TSH SERPL DL<=0.005 MIU/L-ACNC: 2.18 UIU/ML (ref 0.3–4.2)

## 2023-02-13 PROCEDURE — 83036 HEMOGLOBIN GLYCOSYLATED A1C: CPT | Performed by: OBSTETRICS & GYNECOLOGY

## 2023-02-13 PROCEDURE — 85018 HEMOGLOBIN: CPT | Performed by: OBSTETRICS & GYNECOLOGY

## 2023-02-13 PROCEDURE — 84443 ASSAY THYROID STIM HORMONE: CPT | Performed by: OBSTETRICS & GYNECOLOGY

## 2023-02-13 PROCEDURE — 80061 LIPID PANEL: CPT | Performed by: OBSTETRICS & GYNECOLOGY

## 2023-02-14 NOTE — TELEPHONE ENCOUNTER
Patient is 2 weeks s/p right knee PRP injection. Please see patient's mychart message and advise on any recommendations.    Geovanna Churchill MBA, ATC

## 2023-02-17 ENCOUNTER — THERAPY VISIT (OUTPATIENT)
Dept: PHYSICAL THERAPY | Facility: CLINIC | Age: 43
End: 2023-02-17
Payer: COMMERCIAL

## 2023-02-17 DIAGNOSIS — G89.29 CHRONIC PAIN OF RIGHT KNEE: Primary | ICD-10-CM

## 2023-02-17 DIAGNOSIS — M25.561 CHRONIC PAIN OF RIGHT KNEE: Primary | ICD-10-CM

## 2023-02-17 PROCEDURE — 97110 THERAPEUTIC EXERCISES: CPT | Mod: GP | Performed by: PHYSICAL THERAPIST

## 2023-02-17 PROCEDURE — 97112 NEUROMUSCULAR REEDUCATION: CPT | Mod: GP | Performed by: PHYSICAL THERAPIST

## 2023-03-07 ENCOUNTER — ANCILLARY PROCEDURE (OUTPATIENT)
Dept: MAMMOGRAPHY | Facility: CLINIC | Age: 43
End: 2023-03-07
Attending: OBSTETRICS & GYNECOLOGY
Payer: COMMERCIAL

## 2023-03-07 ENCOUNTER — ANCILLARY ORDERS (OUTPATIENT)
Dept: MAMMOGRAPHY | Facility: CLINIC | Age: 43
End: 2023-03-07

## 2023-03-07 DIAGNOSIS — Z12.31 VISIT FOR SCREENING MAMMOGRAM: ICD-10-CM

## 2023-03-07 PROCEDURE — 77067 SCR MAMMO BI INCL CAD: CPT | Mod: TC | Performed by: RADIOLOGY

## 2023-03-07 PROCEDURE — 77063 BREAST TOMOSYNTHESIS BI: CPT | Mod: TC | Performed by: RADIOLOGY

## 2023-04-05 ENCOUNTER — LAB REQUISITION (OUTPATIENT)
Dept: LAB | Facility: CLINIC | Age: 43
End: 2023-04-05

## 2023-04-05 DIAGNOSIS — Z12.4 ENCOUNTER FOR SCREENING FOR MALIGNANT NEOPLASM OF CERVIX: ICD-10-CM

## 2023-04-05 PROCEDURE — 87624 HPV HI-RISK TYP POOLED RSLT: CPT | Performed by: OBSTETRICS & GYNECOLOGY

## 2023-04-05 PROCEDURE — G0145 SCR C/V CYTO,THINLAYER,RESCR: HCPCS | Performed by: OBSTETRICS & GYNECOLOGY

## 2023-04-07 LAB
BKR LAB AP GYN ADEQUACY: NORMAL
BKR LAB AP GYN INTERPRETATION: NORMAL
BKR LAB AP HPV REFLEX: NORMAL
BKR LAB AP LMP: NORMAL
BKR LAB AP PREVIOUS ABNL DX: NORMAL
BKR LAB AP PREVIOUS ABNORMAL: NORMAL
PATH REPORT.COMMENTS IMP SPEC: NORMAL
PATH REPORT.COMMENTS IMP SPEC: NORMAL
PATH REPORT.RELEVANT HX SPEC: NORMAL

## 2023-04-11 LAB
HUMAN PAPILLOMA VIRUS 16 DNA: NEGATIVE
HUMAN PAPILLOMA VIRUS 18 DNA: NEGATIVE
HUMAN PAPILLOMA VIRUS FINAL DIAGNOSIS: NORMAL
HUMAN PAPILLOMA VIRUS OTHER HR: NEGATIVE

## 2023-09-23 ENCOUNTER — HEALTH MAINTENANCE LETTER (OUTPATIENT)
Age: 43
End: 2023-09-23

## 2024-11-16 ENCOUNTER — HEALTH MAINTENANCE LETTER (OUTPATIENT)
Age: 44
End: 2024-11-16

## 2025-05-11 ENCOUNTER — HEALTH MAINTENANCE LETTER (OUTPATIENT)
Age: 45
End: 2025-05-11